# Patient Record
Sex: FEMALE | Race: WHITE | NOT HISPANIC OR LATINO | Employment: FULL TIME | URBAN - NONMETROPOLITAN AREA
[De-identification: names, ages, dates, MRNs, and addresses within clinical notes are randomized per-mention and may not be internally consistent; named-entity substitution may affect disease eponyms.]

---

## 2021-01-20 ENCOUNTER — TRANSCRIBE ORDERS (OUTPATIENT)
Dept: ADMINISTRATIVE | Facility: HOSPITAL | Age: 56
End: 2021-01-20

## 2021-01-20 DIAGNOSIS — Z01.818 PRE-OPERATIVE CLEARANCE: Primary | ICD-10-CM

## 2021-02-23 ENCOUNTER — TRANSCRIBE ORDERS (OUTPATIENT)
Dept: ADMINISTRATIVE | Facility: HOSPITAL | Age: 56
End: 2021-02-23

## 2021-02-23 DIAGNOSIS — Z01.818 PRE-OPERATIVE CLEARANCE: Primary | ICD-10-CM

## 2021-03-18 ENCOUNTER — HOSPITAL ENCOUNTER (OUTPATIENT)
Dept: HOSPITAL 79 - LAB | Age: 56
End: 2021-03-18
Attending: FAMILY MEDICINE
Payer: COMMERCIAL

## 2021-03-18 DIAGNOSIS — E78.2: Primary | ICD-10-CM

## 2021-03-18 DIAGNOSIS — L71.9: ICD-10-CM

## 2021-03-18 DIAGNOSIS — F34.1: ICD-10-CM

## 2021-03-18 DIAGNOSIS — Q23.1: ICD-10-CM

## 2021-03-18 LAB
BUN/CREATININE RATIO: 25 (ref 0–10)
HGB BLD-MCNC: 14 GM/DL (ref 12.3–15.3)
RED BLOOD COUNT: 4.29 M/UL (ref 4–5.1)
WHITE BLOOD COUNT: 9.2 K/UL (ref 4.5–11)

## 2021-03-25 ENCOUNTER — HOSPITAL ENCOUNTER (EMERGENCY)
Dept: HOSPITAL 79 - ER1 | Age: 56
Discharge: HOME | End: 2021-03-25
Payer: COMMERCIAL

## 2021-03-25 DIAGNOSIS — Z90.49: ICD-10-CM

## 2021-03-25 DIAGNOSIS — Z88.0: ICD-10-CM

## 2021-03-25 DIAGNOSIS — E78.5: ICD-10-CM

## 2021-03-25 DIAGNOSIS — L03.113: Primary | ICD-10-CM

## 2021-03-25 DIAGNOSIS — F17.210: ICD-10-CM

## 2021-03-25 LAB
BUN/CREATININE RATIO: 24 (ref 0–10)
HGB BLD-MCNC: 13.2 GM/DL (ref 12.3–15.3)
RED BLOOD COUNT: 4.25 M/UL (ref 4–5.1)
WHITE BLOOD COUNT: 8.3 K/UL (ref 4.5–11)

## 2021-06-25 ENCOUNTER — HOSPITAL ENCOUNTER (OUTPATIENT)
Dept: HOSPITAL 79 - RAD | Age: 56
End: 2021-06-25
Attending: NURSE PRACTITIONER
Payer: COMMERCIAL

## 2021-06-25 DIAGNOSIS — R31.9: Primary | ICD-10-CM

## 2021-06-25 DIAGNOSIS — N20.0: ICD-10-CM

## 2021-07-15 ENCOUNTER — HOSPITAL ENCOUNTER (OUTPATIENT)
Dept: HOSPITAL 79 - KOH-I | Age: 56
End: 2021-07-15
Attending: NURSE PRACTITIONER
Payer: COMMERCIAL

## 2021-07-15 DIAGNOSIS — N20.2: Primary | ICD-10-CM

## 2021-07-15 DIAGNOSIS — R19.09: ICD-10-CM

## 2021-07-30 ENCOUNTER — HOSPITAL ENCOUNTER (OUTPATIENT)
Dept: HOSPITAL 79 - RAD | Age: 56
End: 2021-07-30
Attending: NURSE PRACTITIONER
Payer: COMMERCIAL

## 2021-07-30 DIAGNOSIS — N20.0: Primary | ICD-10-CM

## 2021-08-26 ENCOUNTER — HOSPITAL ENCOUNTER (OUTPATIENT)
Dept: HOSPITAL 79 - OPSV2 | Age: 56
End: 2021-08-26
Payer: COMMERCIAL

## 2021-08-26 DIAGNOSIS — Z01.818: Primary | ICD-10-CM

## 2021-08-26 DIAGNOSIS — R07.9: ICD-10-CM

## 2021-08-26 DIAGNOSIS — N20.0: ICD-10-CM

## 2021-08-26 LAB
BUN/CREATININE RATIO: 21 (ref 0–10)
HGB BLD-MCNC: 12.9 GM/DL (ref 12.3–15.3)
RED BLOOD COUNT: 4.02 M/UL (ref 4–5.1)
WHITE BLOOD COUNT: 6.1 K/UL (ref 4.5–11)

## 2021-11-11 ENCOUNTER — HOSPITAL ENCOUNTER (OUTPATIENT)
Dept: HOSPITAL 79 - OPSV2 | Age: 56
End: 2021-11-11
Attending: OBSTETRICS & GYNECOLOGY
Payer: COMMERCIAL

## 2021-11-11 DIAGNOSIS — R10.2: ICD-10-CM

## 2021-11-11 DIAGNOSIS — Z01.812: Primary | ICD-10-CM

## 2021-11-11 LAB
HGB BLD-MCNC: 12.8 GM/DL (ref 12.3–15.3)
RED BLOOD COUNT: 4.03 M/UL (ref 4–5.1)
WHITE BLOOD COUNT: 7.3 K/UL (ref 4.5–11)

## 2021-11-18 ENCOUNTER — HOSPITAL ENCOUNTER (OUTPATIENT)
Dept: HOSPITAL 79 - OR | Age: 56
LOS: 1 days | Discharge: HOME | End: 2021-11-19
Attending: OBSTETRICS & GYNECOLOGY
Payer: COMMERCIAL

## 2021-11-18 VITALS — HEIGHT: 60 IN | WEIGHT: 209 LBS | BODY MASS INDEX: 41.03 KG/M2

## 2021-11-18 DIAGNOSIS — F32.9: ICD-10-CM

## 2021-11-18 DIAGNOSIS — N83.202: ICD-10-CM

## 2021-11-18 DIAGNOSIS — K21.9: ICD-10-CM

## 2021-11-18 DIAGNOSIS — M19.90: ICD-10-CM

## 2021-11-18 DIAGNOSIS — G47.30: ICD-10-CM

## 2021-11-18 DIAGNOSIS — K42.9: ICD-10-CM

## 2021-11-18 DIAGNOSIS — Z88.1: ICD-10-CM

## 2021-11-18 DIAGNOSIS — Z91.040: ICD-10-CM

## 2021-11-18 DIAGNOSIS — N80.0: ICD-10-CM

## 2021-11-18 DIAGNOSIS — Z88.0: ICD-10-CM

## 2021-11-18 DIAGNOSIS — Q50.4: ICD-10-CM

## 2021-11-18 DIAGNOSIS — N83.201: ICD-10-CM

## 2021-11-18 DIAGNOSIS — N18.30: ICD-10-CM

## 2021-11-18 DIAGNOSIS — N73.6: ICD-10-CM

## 2021-11-18 DIAGNOSIS — I12.9: ICD-10-CM

## 2021-11-18 DIAGNOSIS — D25.9: Primary | ICD-10-CM

## 2021-11-18 DIAGNOSIS — N72: ICD-10-CM

## 2021-11-18 DIAGNOSIS — E66.01: ICD-10-CM

## 2021-11-18 DIAGNOSIS — Z79.899: ICD-10-CM

## 2021-11-18 DIAGNOSIS — F17.210: ICD-10-CM

## 2021-11-18 DIAGNOSIS — E78.2: ICD-10-CM

## 2021-11-18 PROCEDURE — P9045 ALBUMIN (HUMAN), 5%, 250 ML: HCPCS

## 2021-11-18 PROCEDURE — P9047 ALBUMIN (HUMAN), 25%, 50ML: HCPCS

## 2021-11-18 PROCEDURE — C1781 MESH (IMPLANTABLE): HCPCS

## 2021-11-19 LAB — HGB BLD-MCNC: 9.4 GM/DL (ref 12.3–15.3)

## 2021-12-07 ENCOUNTER — OFFICE VISIT (OUTPATIENT)
Dept: GYNECOLOGIC ONCOLOGY | Facility: CLINIC | Age: 56
End: 2021-12-07

## 2021-12-07 VITALS
HEART RATE: 99 BPM | WEIGHT: 202 LBS | DIASTOLIC BLOOD PRESSURE: 87 MMHG | HEIGHT: 60 IN | RESPIRATION RATE: 15 BRPM | TEMPERATURE: 98.6 F | BODY MASS INDEX: 39.66 KG/M2 | SYSTOLIC BLOOD PRESSURE: 168 MMHG

## 2021-12-07 DIAGNOSIS — D39.10 PRIMARY OVARIAN GRANULOSA CELL TUMOR, UNSPECIFIED LATERALITY: ICD-10-CM

## 2021-12-07 DIAGNOSIS — D49.59 OVARIAN TUMOR: Primary | ICD-10-CM

## 2021-12-07 PROBLEM — N20.0 KIDNEY STONE: Status: ACTIVE | Noted: 2021-12-07

## 2021-12-07 PROBLEM — Z87.891 PERSONAL HISTORY OF TOBACCO USE: Status: ACTIVE | Noted: 2021-12-07

## 2021-12-07 PROBLEM — F34.1 DYSTHYMIA: Status: ACTIVE | Noted: 2021-12-07

## 2021-12-07 PROBLEM — Q23.1 BICUSPID AORTIC VALVE: Status: ACTIVE | Noted: 2021-12-07

## 2021-12-07 PROBLEM — E55.9 VITAMIN D DEFICIENCY: Status: ACTIVE | Noted: 2021-12-07

## 2021-12-07 PROBLEM — E78.2 MIXED HYPERLIPIDEMIA: Status: ACTIVE | Noted: 2021-12-07

## 2021-12-07 PROBLEM — J30.9 ALLERGIC RHINITIS: Status: ACTIVE | Noted: 2021-12-07

## 2021-12-07 PROBLEM — Q23.81 BICUSPID AORTIC VALVE: Status: ACTIVE | Noted: 2021-12-07

## 2021-12-07 PROBLEM — L71.9 ROSACEA: Status: ACTIVE | Noted: 2021-12-07

## 2021-12-07 PROCEDURE — 99204 OFFICE O/P NEW MOD 45 MIN: CPT | Performed by: OBSTETRICS & GYNECOLOGY

## 2021-12-07 RX ORDER — METRONIDAZOLE 500 MG/1
TABLET ORAL
COMMUNITY
Start: 2021-12-03 | End: 2021-12-11

## 2021-12-07 RX ORDER — OXYBUTYNIN CHLORIDE 10 MG/1
TABLET, EXTENDED RELEASE ORAL
COMMUNITY
Start: 2021-10-06

## 2021-12-07 RX ORDER — ROSUVASTATIN CALCIUM 20 MG/1
TABLET, COATED ORAL
COMMUNITY
Start: 2021-10-07

## 2021-12-07 NOTE — PROGRESS NOTES
Meliza Mabry  [id number]  1965      Reason for visit: Newly diagnosed granulosa cell tumor of the ovary    Consultation:  Patient is being seen at the request of Maria Victoria Gama MD    History of present illness:  The patient is a 56 y.o. year old female who presents today for treatment and evaluation of the above issues.    Meliza is a new patient who was diagnosed with granulosa cell tumor of the ovary. Her understanding for why she had her hysterectomy in 11/2021 is because she had 2 large cysts, and she opted to have the total over the partial hysterectomy. She brings her blood work with her today from 07/2021, which is reviewed with her.     She does have urinary frequency and occasional bladder spasm. The spasm is worse when she is dehydrated. She denies any urinary incontinence.    She smokes 2 cigarettes per day.  She reports she is healing well from surgery and did not have any complications.    For new patients, PFSH intake form from today was reviewed and confirmed.    OBGYN History:  She is a P1.  She does not use HRT. She does have a history of ASCUS Pap smear.  Benign cervical histology at the time of hysterectomy.    Oncologic History:  Oncology/Hematology History   Primary ovarian granulosa cell tumor, unspecified laterality   11/19/2021 Initial Diagnosis    Primary ovarian granulosa cell tumor, unspecified laterality     11/19/2021 Surgery    Hysterectomy, bilateral [facility]pingo[facility]oophorectomy at [facility]           Past Medical History:   Diagnosis Date   • Granulosa cell tumor        Past Surgical History:   Procedure Laterality Date   • CATARACT EXTRACTION  2021   • HYSTERECTOMY  11/2021       MEDICATIONS: The current medication list was reviewed with the patient and updated in the EMR this date per the Medical Assistant. Medication dosages and frequencies were confirmed to be accurate.      Allergies:  is allergic to sulfamethoxazole, amoxicillin, and latex.    Social History:  "  Social History     Socioeconomic History   • Marital status:    Tobacco Use   • Smoking status: Current Every Day Smoker     Packs/day: 0.10   • Smokeless tobacco: Never Used   Substance and Sexual Activity   • Alcohol use: Not Currently       Family History:  No family history on file.   History of cardiac disease in father and brother both of whom had myocardial infarction.  Mother had hypertension and high cholesterol.  No family history of cancer.    Health Maintenance:    Health Maintenance   Topic Date Due   • MAMMOGRAM  Never done   • COLORECTAL CANCER SCREENING  Never done   • ANNUAL PHYSICAL  Never done   • Pneumococcal Vaccine 0-64 (1 of 2 - PPSV23) Never done   • COVID-19 Vaccine (1) Never done   • TDAP/TD VACCINES (1 - Tdap) Never done   • ZOSTER VACCINE (1 of 2) Never done   • INFLUENZA VACCINE  Never done   • HEPATITIS C SCREENING  Never done   • LIPID PANEL  Never done     Review of Systems   Gastrointestinal: Positive for abdominal pain (Postop discomfort).   Genitourinary: Positive for frequency (Overactive bladder).     Physical Exam    Vitals:    12/07/21 1529   BP: 168/87   Pulse: 99   Resp: 15   Temp: 98.6 °F (37 °C)   TempSrc: Temporal   Weight: 91.6 kg (202 lb)   Height: 152.4 cm (60\")   PainSc:   5   PainLoc: Incisional       Body mass index is 39.45 kg/m².    Wt Readings from Last 3 Encounters:   12/07/21 91.6 kg (202 lb)     GENERAL: Alert, well-appearing female appearing her stated age who is in no apparent distress.  Patient is obese by BMI criteria.  HEENT: Sclera anicteric. Head normocephalic, atraumatic. Mucus membranes moist.   NECK: Trachea midline, supple, without masses.  No thyromegaly.   BREASTS: Deferred  CARDIOVASCULAR: Normal rate, regular rhythm, no murmurs, rubs, or gallops.  No peripheral edema.  RESPIRATORY: Clear to auscultation bilaterally, normal respiratory effort  BACK:  No CVA tenderness, no vertebral tenderness on palpation  GASTROINTESTINAL:  Abdomen is " soft, non-tender, non-distended, no rebound or guarding, no masses.   No HSM.  Incisions clean dry and intact throughout.  Umbilical hernia.  SKIN:  Warm, dry, well-perfused.  All visible areas intact.  No rashes, lesions, ulcers.  PSYCHIATRIC: AO x3, with appropriate affect, normal thought processes.  NEUROLOGIC: No focal deficits.  Moves extremities well.  MUSCULOSKELETAL: Normal gait and station.   EXTREMITIES:   No cyanosis, clubbing, symmetric.  LYMPHATICS:  No cervical or inguinal adenopathy noted.     PELVIC exam:    Deferred due to patient discomfort, postop status    ECOG PS 1 (postop)    PROCEDURES: None    Diagnostic Data:      No Images in the past 120 days found..    No results found for: WBC, HGB, HCT, MCV, PLT, NEUTROABS, GLUCOSE, BUN, CREATININE, EGFRIFNONA, EGFRIFAFRI, NA, K, CL, CO2, MG, PHOS, CALCIUM, ALBUMIN, AST, ALT, BILITOT  No results found for:         Assessment/Plan   This is a 56 y.o. woman with newly diagnosed granulosa cell tumor of the ovary.  Encounter Diagnoses   Name Primary?   • Ovarian tumor Yes   • Primary ovarian granulosa cell tumor, unspecified laterality      I reviewed the pathology report in detail.  Uterus was removed in a piecemeal fashion and it appears that the ovaries were removed intact.  I requested the operative report for my review.  CT scan of abdomen pelvis was ordered.  Preoperative tumor markers such as CA-125, AFP, were normal.  Inhibin a and B was not ordered.  We will go ahead and order this is a baseline.  Patient will follow up for discussion of CT scan results.  We will do a pelvic exam at that point as well.  We discussed that lymph node dissection is usually not a standard part of staging for granulosa cell tumor.  I would lean toward observation if there is no indication of ruptured on available data it of CT scan appears normal.  Further, granulosa cell tumors of the ovaries tend to be indolent and chemo resistant.  If she did develop recurrence,  she would undergo surgical management and systemic therapy.  Patient was overall reassured regarding her condition.  Await the above and follow-up.    Pain assessment was performed today as a part of patient’s care.  For patients with pain related to surgery, gynecologic malignancy or cancer treatment, the plan is as noted in the assessment/plan.  For patients with pain not related to these issues, they are to seek any further needed care from a more appropriate provider, such as PCP.      Orders Placed This Encounter   Procedures   • CT Abdomen Pelvis With Contrast     Standing Status:   Future     Standing Expiration Date:   12/7/2022     Scheduling Instructions:      Pt would like scans in [facility]     Order Specific Question:   Will Oral Contrast be needed for this procedure?     Answer:   Yes     Order Specific Question:   Release to patient     Answer:   Immediate   • Inhibin A, Ultrasensitive     Standing Status:   Future     Standing Expiration Date:   12/7/2022     Order Specific Question:   Release to patient     Answer:   Immediate   • Inhibin B     Standing Status:   Future     Standing Expiration Date:   12/7/2022     Order Specific Question:   Release to patient     Answer:   Immediate       FOLLOW UP: As above    Electronically Signed by: Harriet De La Garza MD  Date: 12/8/2021         Transcribed from ambient dictation for Harriet De La Garza MD by Sue Tejada.  12/07/21   17:53 EST    Patient verbalized consent to the visit recording.  I have personally performed the services described in this document as transcribed by the above individual, and it is both accurate and complete.  Harriet De La Garza MD  12/8/2021  10:10 EST

## 2021-12-09 ENCOUNTER — HOSPITAL ENCOUNTER (OUTPATIENT)
Dept: HOSPITAL 79 - LAB | Age: 56
End: 2021-12-09
Payer: COMMERCIAL

## 2021-12-09 DIAGNOSIS — D39.10: Primary | ICD-10-CM

## 2021-12-15 ENCOUNTER — TELEPHONE (OUTPATIENT)
Dept: GYNECOLOGIC ONCOLOGY | Facility: CLINIC | Age: 56
End: 2021-12-15

## 2021-12-15 NOTE — TELEPHONE ENCOUNTER
----- Message from Harriet De La Garza MD sent at 12/15/2021 12:35 PM EST -----  Please let patient know that tumor markers were normal.  Thank you  ----- Message -----  From: Sara Beltre Incoming  Sent: 12/15/2021   9:10 AM EST  To: Harriet De La Garza MD

## 2021-12-21 ENCOUNTER — HOSPITAL ENCOUNTER (OUTPATIENT)
Dept: HOSPITAL 79 - CT | Age: 56
End: 2021-12-21
Payer: COMMERCIAL

## 2021-12-21 DIAGNOSIS — N13.2: ICD-10-CM

## 2021-12-21 DIAGNOSIS — D49.59: Primary | ICD-10-CM

## 2021-12-29 ENCOUNTER — OFFICE VISIT (OUTPATIENT)
Dept: GYNECOLOGIC ONCOLOGY | Facility: CLINIC | Age: 56
End: 2021-12-29

## 2021-12-29 ENCOUNTER — LAB (OUTPATIENT)
Dept: LAB | Facility: HOSPITAL | Age: 56
End: 2021-12-29

## 2021-12-29 VITALS
HEART RATE: 81 BPM | DIASTOLIC BLOOD PRESSURE: 81 MMHG | RESPIRATION RATE: 16 BRPM | WEIGHT: 199 LBS | HEIGHT: 60 IN | SYSTOLIC BLOOD PRESSURE: 137 MMHG | BODY MASS INDEX: 39.07 KG/M2 | TEMPERATURE: 98 F | OXYGEN SATURATION: 96 %

## 2021-12-29 DIAGNOSIS — D39.10 PRIMARY OVARIAN GRANULOSA CELL TUMOR, UNSPECIFIED LATERALITY: Primary | ICD-10-CM

## 2021-12-29 DIAGNOSIS — Z98.890 POST-OPERATIVE STATE: ICD-10-CM

## 2021-12-29 LAB
ERYTHROCYTE [DISTWIDTH] IN BLOOD BY AUTOMATED COUNT: 13.8 % (ref 12.3–15.4)
HCT VFR BLD AUTO: 33.3 % (ref 34–46.6)
HGB BLD-MCNC: 11.1 G/DL (ref 12–15.9)
LYMPHOCYTES # BLD AUTO: 2.3 10*3/MM3 (ref 0.7–3.1)
LYMPHOCYTES NFR BLD AUTO: 29.3 % (ref 19.6–45.3)
MCH RBC QN AUTO: 31.8 PG (ref 26.6–33)
MCHC RBC AUTO-ENTMCNC: 33.3 G/DL (ref 31.5–35.7)
MCV RBC AUTO: 95.5 FL (ref 79–97)
MONOCYTES # BLD AUTO: 0.4 10*3/MM3 (ref 0.1–0.9)
MONOCYTES NFR BLD AUTO: 4.5 % (ref 5–12)
NEUTROPHILS NFR BLD AUTO: 5.2 10*3/MM3 (ref 1.7–7)
NEUTROPHILS NFR BLD AUTO: 66.2 % (ref 42.7–76)
PLATELET # BLD AUTO: 278 10*3/MM3 (ref 140–450)
PMV BLD AUTO: 7.6 FL (ref 6–12)
RBC # BLD AUTO: 3.48 10*6/MM3 (ref 3.77–5.28)
WBC NRBC COR # BLD: 7.8 10*3/MM3 (ref 3.4–10.8)

## 2021-12-29 PROCEDURE — 36415 COLL VENOUS BLD VENIPUNCTURE: CPT

## 2021-12-29 PROCEDURE — 99213 OFFICE O/P EST LOW 20 MIN: CPT | Performed by: OBSTETRICS & GYNECOLOGY

## 2021-12-29 PROCEDURE — 85025 COMPLETE CBC W/AUTO DIFF WBC: CPT

## 2021-12-29 RX ORDER — FLUCONAZOLE 100 MG/1
100 TABLET ORAL DAILY
COMMUNITY
End: 2023-01-23

## 2022-03-24 NOTE — PROGRESS NOTES
"     Follow Up Office Visit      Patient Name: Meliza Mabry  : 1965   MRN: [id number]     Chief Complaint:  Surveillance for history of granulosa cell tumor    History of Present Illness: Meliza Mabry is a 56 y.o. 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 Today, she is doing well. She denies vaginal bleeding and discharge. She does not have abdominal or pelvic pain. She does report an umbilical hernia which she has had for some time. It is reducible. It only bothers her when \"she eats too much\".  She is tolerating a regular diet and endorses a normal appetite.  She denies early satiety and bloating. Denies any CP, SOB, lightheadedness or dizziness. She denies changes in her bowel/bladder. Does have chronic constipation which she manages at home with a bowel regimen.    Oncologic History:  Oncology/Hematology History   Primary ovarian granulosa cell tumor, unspecified laterality   2021 Initial Diagnosis    Primary ovarian granulosa cell tumor, unspecified laterality     2021 Surgery    Hysterectomy, bilateral sal[facility]oophorectomy at [facility]          I have reviewed and the following portions of the patient's history were updated as appropriate: past family history, past medical history, past social history, past surgical history, past obstetrics/gynecologic history and problem list.    Medications: The current medication list was reviewed with the patient and updated in the EMR this date per the Medical Assistant. Medication dosages and frequencies were confirmed to be accurate.      Allergies:   Allergies   Allergen Reactions   • Sulfamethoxazole Other (See Comments)   • Amoxicillin Rash   • Latex Rash       Subjective    Review of Systems   Constitutional: Negative for appetite change, chills, fatigue and fever.   Respiratory: Negative for cough and shortness of breath.    Cardiovascular: " "Negative for chest pain, leg swelling and palpitations.   Gastrointestinal: Positive for constipation and nausea (Only when very constipated.). Negative for abdominal distention, abdominal pain, diarrhea and vomiting.   Genitourinary: Negative for difficulty urinating, frequency, vaginal bleeding and vaginal discharge.    Neurological: Negative for dizziness and light-headedness.       Objective     Physical Exam:  Vital Signs:   Vitals:    03/28/22 1058   BP: 138/83   Pulse: 96   Resp: 18   Temp: 98 °F (36.7 °C)   TempSrc: Tympanic   SpO2: 100%   Weight: 89.7 kg (197 lb 12.8 oz)   Height: 152.4 cm (60\")   PainSc: 0-No pain     BMI: Body mass index is 38.63 kg/m².               PHQ-2 Depression Screening  Little interest or pleasure in doing things?     Feeling down, depressed, or hopeless?     PHQ-2 Total Score           Physical Exam  Vitals and nursing note reviewed. Exam conducted with a chaperone present.   Constitutional:       General: She is not in acute distress.     Appearance: Normal appearance. She is well-developed. She is obese. She is not diaphoretic.   HENT:      Head: Normocephalic and atraumatic.      Right Ear: External ear normal.      Left Ear: External ear normal.      Nose:      Comments: Mask  Eyes:      General: No scleral icterus.        Right eye: No discharge.         Left eye: No discharge.      Conjunctiva/sclera: Conjunctivae normal.   Neck:      Thyroid: No thyromegaly.   Cardiovascular:      Rate and Rhythm: Normal rate and regular rhythm.      Heart sounds: No murmur heard.  Pulmonary:      Effort: Pulmonary effort is normal. No respiratory distress.      Breath sounds: Normal breath sounds. No wheezing.   Abdominal:      General: There is no distension.      Palpations: Abdomen is soft. There is no mass.      Tenderness: There is no abdominal tenderness. There is no guarding.      Hernia: A hernia (Apprxoimately 3 cm umbilical hernia that is reducible and nontender) is present. "   Genitourinary:     Comments: External genitalia normal. Vagina without discharge. Cervix, uterus and adnexa surgically absent. Rectovaginal exam deferred.  Musculoskeletal:         General: No swelling, tenderness, deformity or signs of injury.      Cervical back: Neck supple.      Right lower leg: No edema.      Left lower leg: No edema.   Lymphadenopathy:      Cervical: No cervical adenopathy.   Skin:     General: Skin is warm and dry.      Coloration: Skin is not jaundiced.      Findings: No erythema, lesion or rash.   Neurological:      General: No focal deficit present.      Mental Status: She is alert and oriented to person, place, and time. Mental status is at baseline.      Motor: No abnormal muscle tone.   Psychiatric:         Behavior: Behavior normal.         Thought Content: Thought content normal.       Date Inhibin A Inhibin B    12/7/2021 0.5 8.6    3/28/2022                                  Assessment / Plan    Meliza Mabry is a 56 y.o. female who has a history of a granulosa cell tumor s/p incomplete staging at OSH followed by negative imaging (treatment completed in 11/2021).  She is currently without clinical evidence of disease. Signs of recurrent disease, such as abdominal bloating or distention, vaginal bleeding, abdominopelvic pain, urinary or bowel changes, and shortness of breath were reviewed. She was advised to follow up immediately if she develops any of the above symptoms. She was also reminded to maintain a healthy lifestyle with a well balanced diet, calcium and vitamin D for osteoporosis prevention, and exercise as well as continue with recommended health and cancer screening guidelines. She will follow-up in 3 month with inhibin A and B prior to her next visit.    Umbilical hernia: Largely asymptomatic. Discussed ER precautions. Patient does not desire surgical intervention at this time.    Pain assessment was performed today as a part of patient’s care.  For patients with pain  related to surgery, gynecologic malignancy or cancer treatment, the plan is as noted in the assessment/plan.  For patients with pain not related to these issues, they are to seek any further needed care from a more appropriate provider, such as PCP.      Diagnoses and all orders for this visit:    1. History of ovarian cancer (Primary)  -     Inhibin A, Ultrasensitive  -     Inhibin B    2. Primary ovarian granulosa cell tumor, unspecified laterality  -     Inhibin A, Ultrasensitive  -     Inhibin B    3. Umbilical hernia without obstruction and without gangrene         Follow Up: 3 months    HEYDI Rhoades MD  Gynecologic Oncology

## 2022-03-28 ENCOUNTER — OFFICE VISIT (OUTPATIENT)
Dept: GYNECOLOGIC ONCOLOGY | Facility: CLINIC | Age: 57
End: 2022-03-28

## 2022-03-28 VITALS
HEIGHT: 60 IN | HEART RATE: 96 BPM | OXYGEN SATURATION: 100 % | WEIGHT: 197.8 LBS | SYSTOLIC BLOOD PRESSURE: 138 MMHG | TEMPERATURE: 98 F | BODY MASS INDEX: 38.83 KG/M2 | DIASTOLIC BLOOD PRESSURE: 83 MMHG | RESPIRATION RATE: 18 BRPM

## 2022-03-28 DIAGNOSIS — K42.9 UMBILICAL HERNIA WITHOUT OBSTRUCTION AND WITHOUT GANGRENE: ICD-10-CM

## 2022-03-28 DIAGNOSIS — Z85.43 HISTORY OF OVARIAN CANCER: Primary | ICD-10-CM

## 2022-03-28 DIAGNOSIS — D39.10 PRIMARY OVARIAN GRANULOSA CELL TUMOR, UNSPECIFIED LATERALITY: ICD-10-CM

## 2022-03-28 PROCEDURE — 99213 OFFICE O/P EST LOW 20 MIN: CPT | Performed by: OBSTETRICS & GYNECOLOGY

## 2022-03-28 PROCEDURE — 83520 IMMUNOASSAY QUANT NOS NONAB: CPT | Performed by: OBSTETRICS & GYNECOLOGY

## 2022-03-28 PROCEDURE — 86336 INHIBIN A: CPT | Performed by: OBSTETRICS & GYNECOLOGY

## 2022-03-31 LAB
INHIBIN A SERPL-MCNC: 0.7 PG/ML
INHIBIN B SERPL-MCNC: <7 PG/ML (ref 0–16.9)

## 2022-04-01 ENCOUNTER — TELEPHONE (OUTPATIENT)
Dept: GYNECOLOGIC ONCOLOGY | Facility: CLINIC | Age: 57
End: 2022-04-01

## 2022-04-01 NOTE — TELEPHONE ENCOUNTER
----- Message from Harriet De La Garza MD sent at 4/1/2022  9:58 AM EDT -----  Please notify patient tumor markers normal range.  Thanks      ----- Message -----  From: Lab, Background User  Sent: 3/31/2022   3:12 PM EDT  To: Harriet De La Garza MD

## 2022-05-16 ENCOUNTER — HOSPITAL ENCOUNTER (OUTPATIENT)
Dept: HOSPITAL 79 - KOH-I | Age: 57
End: 2022-05-16
Attending: FAMILY MEDICINE
Payer: COMMERCIAL

## 2022-05-16 DIAGNOSIS — F17.210: Primary | ICD-10-CM

## 2022-06-27 ENCOUNTER — OFFICE VISIT (OUTPATIENT)
Dept: GYNECOLOGIC ONCOLOGY | Facility: CLINIC | Age: 57
End: 2022-06-27

## 2022-06-27 VITALS
HEART RATE: 94 BPM | BODY MASS INDEX: 38.28 KG/M2 | SYSTOLIC BLOOD PRESSURE: 155 MMHG | OXYGEN SATURATION: 98 % | DIASTOLIC BLOOD PRESSURE: 93 MMHG | WEIGHT: 196 LBS | RESPIRATION RATE: 16 BRPM

## 2022-06-27 DIAGNOSIS — Z85.43 HISTORY OF OVARIAN CANCER: Primary | ICD-10-CM

## 2022-06-27 DIAGNOSIS — D39.10 PRIMARY OVARIAN GRANULOSA CELL TUMOR, UNSPECIFIED LATERALITY: ICD-10-CM

## 2022-06-27 DIAGNOSIS — K42.9 UMBILICAL HERNIA WITHOUT OBSTRUCTION AND WITHOUT GANGRENE: ICD-10-CM

## 2022-06-27 PROCEDURE — 99213 OFFICE O/P EST LOW 20 MIN: CPT | Performed by: OBSTETRICS & GYNECOLOGY

## 2022-06-27 NOTE — PROGRESS NOTES
Follow Up Office Visit      Patient Name: Meliza Mabry  : 1965   MRN: [id number]     Chief Complaint:  Surveillance for history of granulosa cell tumor    History of Present Illness: Meliza Mabry is a 57 y.o. 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 Today, she is doing well. She denies vaginal bleeding and discharge. She does not have abdominal or pelvic pain. She feels like her umbilical hernia is stable. She is tolerating a regular diet and endorses a normal appetite.  She denies early satiety and bloating. Denies any CP, SOB, lightheadedness or dizziness. She denies changes in her bowel/bladder. Does have chronic constipation which she manages at home with a bowel regimen but this is stable. Has a trip to [place] planned.    Oncologic History:  Oncology/Hematology History   Primary ovarian granulosa cell tumor, unspecified laterality   2021 Initial Diagnosis    Primary ovarian granulosa cell tumor, unspecified laterality     2021 Surgery    Hysterectomy, bilateral salpingo-oophorectomy at [facility]          I have reviewed and the following portions of the patient's history were updated as appropriate: past family history, past medical history, past social history, past surgical history, past obstetrics/gynecologic history and problem list.    Medications: The current medication list was reviewed with the patient and updated in the EMR this date per the Medical Assistant. Medication dosages and frequencies were confirmed to be accurate.      Allergies:   Allergies   Allergen Reactions   • Sulfamethoxazole Other (See Comments)   • Amoxicillin Rash   • Latex Rash       Subjective    Review of Systems   Constitutional: Negative for appetite change, chills, fatigue and fever.   Respiratory: Negative for cough and shortness of breath.    Cardiovascular: Negative for chest pain, leg swelling  and palpitations.   Gastrointestinal: Positive for constipation. Negative for abdominal distention, abdominal pain, diarrhea, nausea and vomiting.   Genitourinary: Negative for difficulty urinating, frequency, vaginal bleeding and vaginal discharge.    Neurological: Negative for dizziness and light-headedness.       Objective     Physical Exam:  Vital Signs:   Vitals:    06/27/22 1104   BP: 155/93   Pulse: 94   Resp: 16   SpO2: 98%   Weight: 88.9 kg (196 lb)   PainSc: 0-No pain     BMI: Body mass index is 38.28 kg/m².               PHQ-2 Depression Screening  Little interest or pleasure in doing things? 0-->not at all   Feeling down, depressed, or hopeless? 0-->not at all   PHQ-2 Total Score 0         Physical Exam  Vitals and nursing note reviewed. Exam conducted with a chaperone present.   Constitutional:       General: She is not in acute distress.     Appearance: Normal appearance. She is well-developed. She is obese. She is not diaphoretic.   HENT:      Head: Normocephalic and atraumatic.      Right Ear: External ear normal.      Left Ear: External ear normal.      Nose:      Comments: Mask  Eyes:      General: No scleral icterus.        Right eye: No discharge.         Left eye: No discharge.      Conjunctiva/sclera: Conjunctivae normal.   Neck:      Thyroid: No thyromegaly.   Cardiovascular:      Rate and Rhythm: Normal rate and regular rhythm.      Heart sounds: No murmur heard.  Pulmonary:      Effort: Pulmonary effort is normal. No respiratory distress.      Breath sounds: Normal breath sounds. No wheezing.   Abdominal:      General: There is no distension.      Palpations: Abdomen is soft. There is no mass.      Tenderness: There is no abdominal tenderness. There is no guarding.      Hernia: A hernia (Approximately 4 cm umbilical hernia that is reducible and nontender) is present.   Genitourinary:     Comments: External genitalia normal. Vagina without discharge. Cervix, uterus and adnexa surgically  absent. Rectovaginal exam deferred.  Musculoskeletal:         General: No swelling, tenderness, deformity or signs of injury.      Cervical back: Neck supple.      Right lower leg: No edema.      Left lower leg: No edema.   Lymphadenopathy:      Cervical: No cervical adenopathy.   Skin:     General: Skin is warm and dry.      Coloration: Skin is not jaundiced.      Findings: No erythema, lesion or rash.   Neurological:      General: No focal deficit present.      Mental Status: She is alert and oriented to person, place, and time. Mental status is at baseline.      Motor: No abnormal muscle tone.   Psychiatric:         Behavior: Behavior normal.         Thought Content: Thought content normal.       Date Inhibin A Inhibin B    12/7/2021 0.5 8.6    3/28/2022 0.7 <7                                Assessment / Plan    Meliza Mabry is a 57 y.o. female who has a history of a granulosa cell tumor s/p incomplete staging at OSH followed by negative imaging (treatment completed in 11/2021).  She is currently without clinical evidence of disease. Signs of recurrent disease, such as abdominal bloating or distention, vaginal bleeding, abdominopelvic pain, urinary or bowel changes, and shortness of breath were reviewed. She was advised to follow up immediately if she develops any of the above symptoms. She was also reminded to maintain a healthy lifestyle with a well balanced diet, calcium and vitamin D for osteoporosis prevention, and exercise as well as continue with recommended health and cancer screening guidelines. She will follow-up in 3 month with inhibin A and B prior to her next visit.    Umbilical hernia: Remains asymptomatic. Discussed ER precautions. Patient does not desire surgical intervention at this time.    Pain assessment was performed today as a part of patient’s care.  For patients with pain related to surgery, gynecologic malignancy or cancer treatment, the plan is as noted in the assessment/plan.  For  patients with pain not related to these issues, they are to seek any further needed care from a more appropriate provider, such as PCP.      Diagnoses and all orders for this visit:    1. History of ovarian cancer (Primary)    2. Primary ovarian granulosa cell tumor, unspecified laterality  -     Inhibin A, Ultrasensitive; Future  -     Inhibin B; Future    3. Umbilical hernia without obstruction and without gangrene         Follow Up: 3 months    HEYDI Rhoades MD  Gynecologic Oncology

## 2022-07-24 ENCOUNTER — HOSPITAL ENCOUNTER (OUTPATIENT)
Dept: HOSPITAL 79 - LAB | Age: 57
End: 2022-07-24
Payer: COMMERCIAL

## 2022-07-24 DIAGNOSIS — D39.10: Primary | ICD-10-CM

## 2022-07-26 ENCOUNTER — TELEPHONE (OUTPATIENT)
Dept: GYNECOLOGIC ONCOLOGY | Facility: CLINIC | Age: 57
End: 2022-07-26

## 2022-07-26 NOTE — TELEPHONE ENCOUNTER
Called pt and she had inhibins albert and she was just waiting on labs. She did not need anything faxed to her knowledge.

## 2022-07-27 ENCOUNTER — TELEPHONE (OUTPATIENT)
Dept: GYNECOLOGIC ONCOLOGY | Facility: CLINIC | Age: 57
End: 2022-07-27

## 2022-07-27 NOTE — TELEPHONE ENCOUNTER
Caller: CHRISTIN MARQUES.    Relationship: Provider    Best call back number: [phone]    What orders are you requesting (i.e. lab or imaging): THEY HAVE ORDERS BUT THEY WERE NOT SIGNED BY DR. GUTIERREZ & THEY NEED SIGNED COPIES.    In what timeframe would the patient need to come in: ASAP, AS THEY HAVE ALREADY DRAWN THE LABS.    Where will you receive your lab/imaging services: [facility]    Additional notes: CHRISTIN SPOKE WITH SOMEONE IN THE OFFICE ON TUES. & THAT PERSON STATED SHE WOULD HAVE THEM FAXED RIGHT AWAY & THEY NEVER GOT THEM.  PLEASE FAX -[phone] OR ELECTRONICALLY [phone].             Yes

## 2022-07-29 NOTE — TELEPHONE ENCOUNTER
[facility] CALLING BACK WANTED TO GIVE ANOTHER FAX NUMBER THEY DONT THINK THE OTHER IS WORKING    PLEASE RE FAX TO #[phone]

## 2022-07-29 NOTE — TELEPHONE ENCOUNTER
RAY, CAN YOU PLEASE FAX AGAIN TO  [phone], AS CHRISTIN DID NOT RECEIVE THE ORIGINAL  ONE YOU FAXED, CAN YOU ALSO CALL HER TO WHEN YOU FAX IT , SO SHE CAN BE LOOKING FOR IT, [phone]?  THANK YOU

## 2022-10-24 ENCOUNTER — TELEPHONE (OUTPATIENT)
Dept: GYNECOLOGIC ONCOLOGY | Facility: CLINIC | Age: 57
End: 2022-10-24

## 2022-10-24 NOTE — TELEPHONE ENCOUNTER
Caller: Meliza Mabry    Relationship: Self    Best call back number: [phone]        Who are you requesting to speak with (clinical staff, provider,  specific staff member): SCHEDULING          What was the call regarding: NEEDING TO R/S APPT TODAY 10/24 WITH DR CONWAY IN [place]    Do you require a callback: YES

## 2022-11-28 ENCOUNTER — TELEPHONE (OUTPATIENT)
Dept: GYNECOLOGIC ONCOLOGY | Facility: CLINIC | Age: 57
End: 2022-11-28

## 2022-11-28 NOTE — TELEPHONE ENCOUNTER
Caller: Meliza Mabry    Relationship: Self    Best call back number: [phone]    What is the best time to reach you: ANYTIME    Who are you requesting to speak with (clinical staff, provider,  specific staff member): SCHEDULING    What was the call regarding: PT CALLING TO R/S HER APPT FOR TODAY AT THE [facility] - SHE HAS COVID.     PLEASE CALL TO R/S.     Do you require a callback: YES

## 2023-01-23 ENCOUNTER — OFFICE VISIT (OUTPATIENT)
Dept: GYNECOLOGIC ONCOLOGY | Facility: CLINIC | Age: 58
End: 2023-01-23
Payer: COMMERCIAL

## 2023-01-23 VITALS
TEMPERATURE: 98.2 F | HEIGHT: 60 IN | BODY MASS INDEX: 38.36 KG/M2 | RESPIRATION RATE: 18 BRPM | OXYGEN SATURATION: 100 % | DIASTOLIC BLOOD PRESSURE: 80 MMHG | HEART RATE: 106 BPM | SYSTOLIC BLOOD PRESSURE: 123 MMHG | WEIGHT: 195.4 LBS

## 2023-01-23 DIAGNOSIS — K42.9 UMBILICAL HERNIA WITHOUT OBSTRUCTION AND WITHOUT GANGRENE: ICD-10-CM

## 2023-01-23 DIAGNOSIS — D39.10 PRIMARY OVARIAN GRANULOSA CELL TUMOR, UNSPECIFIED LATERALITY: Primary | ICD-10-CM

## 2023-01-23 PROCEDURE — 99213 OFFICE O/P EST LOW 20 MIN: CPT | Performed by: OBSTETRICS & GYNECOLOGY

## 2023-01-23 RX ORDER — FLUCONAZOLE 150 MG/1
150 TABLET ORAL ONCE
Qty: 1 TABLET | Refills: 1 | Status: SHIPPED | OUTPATIENT
Start: 2023-01-23 | End: 2023-01-23

## 2023-01-23 RX ORDER — CETIRIZINE HYDROCHLORIDE 10 MG/1
10 TABLET ORAL DAILY
COMMUNITY
Start: 2022-12-07 | End: 2023-03-08

## 2023-01-23 RX ORDER — FENOFIBRATE 145 MG/1
TABLET, COATED ORAL
COMMUNITY
Start: 2023-01-06

## 2023-01-23 NOTE — PROGRESS NOTES
Follow Up Office Visit      Patient Name: Meliza Mabry  : 1965   MRN: [id number]     Chief Complaint:  Surveillance for history of granulosa cell tumor    History of Present Illness: Meliza Mabry is a 57 y.o. 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 Last surveillance visit rescheduled as patient had COVID. Today, she is doing well. She denies vaginal bleeding and discharge. She does not have abdominal or pelvic pain. She feels like her umbilical hernia is stable. She is considering an evaluation for possible repair. She is tolerating a regular diet and endorses a normal appetite.  She denies early satiety and bloating. Denies any CP, SOB, lightheadedness or dizziness. She denies changes in her bowel/bladder.    Oncologic History:  Oncology/Hematology History   Primary ovarian granulosa cell tumor, unspecified laterality   2021 Initial Diagnosis    Primary ovarian granulosa cell tumor, unspecified laterality     2021 Surgery    Hysterectomy, bilateral salpingo-oophorectomy at [facility]          I have reviewed and the following portions of the patient's history were updated as appropriate: past family history, past medical history, past social history, past surgical history, past obstetrics/gynecologic history and problem list.    Medications: The current medication list was reviewed with the patient and updated in the EMR this date per the Medical Assistant. Medication dosages and frequencies were confirmed to be accurate.      Allergies:   Allergies   Allergen Reactions   • Sulfamethoxazole Other (See Comments)   • Amoxicillin Rash   • Latex Rash       Subjective    Review of Systems   Constitutional: Negative for appetite change, chills, fatigue and fever.   Respiratory: Negative for cough and shortness of breath.    Cardiovascular: Negative for chest pain, leg swelling and palpitations.  "  Gastrointestinal: Positive for constipation. Negative for abdominal distention, abdominal pain, diarrhea, nausea and vomiting.   Genitourinary: Negative for difficulty urinating, frequency, vaginal bleeding and vaginal discharge.    Neurological: Negative for dizziness and light-headedness.       Objective     Physical Exam:  Vital Signs:   Vitals:    01/23/23 1348   BP: 123/80   Pulse: 106   Resp: 18   Temp: 98.2 °F (36.8 °C)   TempSrc: Infrared   SpO2: 100%   Weight: 88.6 kg (195 lb 6.4 oz)   Height: 152.4 cm (60\")   PainSc: 0-No pain     BMI: Body mass index is 38.16 kg/m².   ECOG score: 0           PHQ-2 Depression Screening  Little interest or pleasure in doing things? 0-->not at all   Feeling down, depressed, or hopeless? 0-->not at all   PHQ-2 Total Score 0         Physical Exam  Vitals and nursing note reviewed. Exam conducted with a chaperone present.   Constitutional:       General: She is not in acute distress.     Appearance: Normal appearance. She is well-developed. She is obese. She is not diaphoretic.   HENT:      Head: Normocephalic and atraumatic.      Right Ear: External ear normal.      Left Ear: External ear normal.      Nose:      Comments: Mask  Eyes:      General: No scleral icterus.        Right eye: No discharge.         Left eye: No discharge.      Conjunctiva/sclera: Conjunctivae normal.   Neck:      Thyroid: No thyromegaly.   Cardiovascular:      Rate and Rhythm: Normal rate and regular rhythm.      Heart sounds: No murmur heard.  Pulmonary:      Effort: Pulmonary effort is normal. No respiratory distress.      Breath sounds: Normal breath sounds. No wheezing.   Abdominal:      General: There is no distension.      Palpations: Abdomen is soft. There is no mass.      Tenderness: There is no abdominal tenderness. There is no guarding.      Hernia: A hernia (Approximately 4 cm umbilical hernia that is reducible and nontender) is present.   Genitourinary:     Comments: External genitalia " normal. Vaginal mucosa slightly erythematous with white discharge. Cervix, uterus and adnexa surgically absent. Rectovaginal exam deferred.  Musculoskeletal:         General: No swelling, tenderness, deformity or signs of injury.      Cervical back: Neck supple.      Right lower leg: No edema.      Left lower leg: No edema.   Lymphadenopathy:      Cervical: No cervical adenopathy.   Skin:     General: Skin is warm and dry.      Coloration: Skin is not jaundiced.      Findings: No erythema, lesion or rash.   Neurological:      General: No focal deficit present.      Mental Status: She is alert and oriented to person, place, and time. Mental status is at baseline.      Motor: No abnormal muscle tone.   Psychiatric:         Behavior: Behavior normal.         Thought Content: Thought content normal.       Date Inhibin A Inhibin B    12/7/2021 0.5 8.6    3/28/2022 0.7 <7    7/24/2022 2.9 <7                          Assessment / Plan    Meliza Mabry is a 57 y.o. female who has a history of a granulosa cell tumor s/p incomplete staging at OSH followed by negative imaging (treatment completed in 11/2021).  She is currently without clinical evidence of disease. Signs of recurrent disease, such as abdominal bloating or distention, vaginal bleeding, abdominopelvic pain, urinary or bowel changes, and shortness of breath were reviewed. She was advised to follow up immediately if she develops any of the above symptoms. She was also reminded to maintain a healthy lifestyle with a well balanced diet, calcium and vitamin D for osteoporosis prevention, and exercise as well as continue with recommended health and cancer screening guidelines. She will follow-up in 3 month with inhibin A and B prior to her next visit.    Umbilical hernia: Remains asymptomatic. Discussed ER precautions. Patient considering seeing a local general surgeon to discuss possible repair.  Vulvovaginal candidiasis: Rx for diflucan today.     Diagnoses and all  orders for this visit:    1. Primary ovarian granulosa cell tumor, unspecified laterality (Primary)  -     Inhibin A, Ultrasensitive; Future  -     Inhibin B; Future    2. Umbilical hernia without obstruction and without gangrene    Other orders  -     fluconazole (DIFLUCAN) 150 MG tablet; Take 1 tablet by mouth 1 (One) Time for 1 dose.  Dispense: 1 tablet; Refill: 1         Follow Up: 3 months    HEYDI Rhoades MD  Gynecologic Oncology

## 2023-01-23 NOTE — LETTER
2023     Samuel Duane Kreis, MD  [address] [place] [address] [place] [address]    Patient: Meliza Mabry   YOB: 1965   Date of Visit: 2023       Dear Samuel Duane Kreis, MD    Meliza Mabry was in my office today. Below is a copy of my note.    If you have questions, please do not hesitate to call me. I look forward to following Meliza along with you.         Sincerely,        Guerda Rhoades MD        CC: No Recipients         Follow Up Office Visit      Patient Name: Meliza Mabry  : 1965   MRN: [id number]     Chief Complaint:  Surveillance for history of granulosa cell tumor    History of Present Illness: Meliza Mabry is a 57 y.o. 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 Last surveillance visit rescheduled as patient had COVID. Today, she is doing well. She denies vaginal bleeding and discharge. She does not have abdominal or pelvic pain. She feels like her umbilical hernia is stable. She is considering an evaluation for possible repair. She is tolerating a regular diet and endorses a normal appetite.  She denies early satiety and bloating. Denies any CP, SOB, lightheadedness or dizziness. She denies changes in her bowel/bladder.    Oncologic History:  Oncology/Hematology History   Primary ovarian granulosa cell tumor, unspecified laterality   2021 Initial Diagnosis    Primary ovarian granulosa cell tumor, unspecified laterality     2021 Surgery    Hysterectomy, bilateral salpingo-oophorectomy at [facility]          I have reviewed and the following portions of the patient's history were updated as appropriate: past family history, past medical history, past social history, past surgical history, past obstetrics/gynecologic history and problem list.    Medications: The current medication list was reviewed with the patient and updated in the EMR this date per the  "Medical Assistant. Medication dosages and frequencies were confirmed to be accurate.      Allergies:   Allergies   Allergen Reactions   • Sulfamethoxazole Other (See Comments)   • Amoxicillin Rash   • Latex Rash       Subjective    Review of Systems   Constitutional: Negative for appetite change, chills, fatigue and fever.   Respiratory: Negative for cough and shortness of breath.    Cardiovascular: Negative for chest pain, leg swelling and palpitations.   Gastrointestinal: Positive for constipation. Negative for abdominal distention, abdominal pain, diarrhea, nausea and vomiting.   Genitourinary: Negative for difficulty urinating, frequency, vaginal bleeding and vaginal discharge.    Neurological: Negative for dizziness and light-headedness.       Objective     Physical Exam:  Vital Signs:   Vitals:    01/23/23 1348   BP: 123/80   Pulse: 106   Resp: 18   Temp: 98.2 °F (36.8 °C)   TempSrc: Infrared   SpO2: 100%   Weight: 88.6 kg (195 lb 6.4 oz)   Height: 152.4 cm (60\")   PainSc: 0-No pain     BMI: Body mass index is 38.16 kg/m².   ECOG score: 0           PHQ-2 Depression Screening  Little interest or pleasure in doing things? 0-->not at all   Feeling down, depressed, or hopeless? 0-->not at all   PHQ-2 Total Score 0         Physical Exam  Vitals and nursing note reviewed. Exam conducted with a chaperone present.   Constitutional:       General: She is not in acute distress.     Appearance: Normal appearance. She is well-developed. She is obese. She is not diaphoretic.   HENT:      Head: Normocephalic and atraumatic.      Right Ear: External ear normal.      Left Ear: External ear normal.      Nose:      Comments: Mask  Eyes:      General: No scleral icterus.        Right eye: No discharge.         Left eye: No discharge.      Conjunctiva/sclera: Conjunctivae normal.   Neck:      Thyroid: No thyromegaly.   Cardiovascular:      Rate and Rhythm: Normal rate and regular rhythm.      Heart sounds: No murmur " heard.  Pulmonary:      Effort: Pulmonary effort is normal. No respiratory distress.      Breath sounds: Normal breath sounds. No wheezing.   Abdominal:      General: There is no distension.      Palpations: Abdomen is soft. There is no mass.      Tenderness: There is no abdominal tenderness. There is no guarding.      Hernia: A hernia (Approximately 4 cm umbilical hernia that is reducible and nontender) is present.   Genitourinary:     Comments: External genitalia normal. Vaginal mucosa slightly erythematous with white discharge. Cervix, uterus and adnexa surgically absent. Rectovaginal exam deferred.  Musculoskeletal:         General: No swelling, tenderness, deformity or signs of injury.      Cervical back: Neck supple.      Right lower leg: No edema.      Left lower leg: No edema.   Lymphadenopathy:      Cervical: No cervical adenopathy.   Skin:     General: Skin is warm and dry.      Coloration: Skin is not jaundiced.      Findings: No erythema, lesion or rash.   Neurological:      General: No focal deficit present.      Mental Status: She is alert and oriented to person, place, and time. Mental status is at baseline.      Motor: No abnormal muscle tone.   Psychiatric:         Behavior: Behavior normal.         Thought Content: Thought content normal.       Date Inhibin A Inhibin B    12/7/2021 0.5 8.6    3/28/2022 0.7 <7    7/24/2022 2.9 <7                          Assessment / Plan    Meliza Mabry is a 57 y.o. female who has a history of a granulosa cell tumor s/p incomplete staging at OSH followed by negative imaging (treatment completed in 11/2021).  She is currently without clinical evidence of disease. Signs of recurrent disease, such as abdominal bloating or distention, vaginal bleeding, abdominopelvic pain, urinary or bowel changes, and shortness of breath were reviewed. She was advised to follow up immediately if she develops any of the above symptoms. She was also reminded to maintain a healthy  lifestyle with a well balanced diet, calcium and vitamin D for osteoporosis prevention, and exercise as well as continue with recommended health and cancer screening guidelines. She will follow-up in 3 month with inhibin A and B prior to her next visit.    Umbilical hernia: Remains asymptomatic. Discussed ER precautions. Patient considering seeing a local general surgeon to discuss possible repair.  Vulvovaginal candidiasis: Rx for diflucan today.     Diagnoses and all orders for this visit:    1. Primary ovarian granulosa cell tumor, unspecified laterality (Primary)  -     Inhibin A, Ultrasensitive; Future  -     Inhibin B; Future    2. Umbilical hernia without obstruction and without gangrene    Other orders  -     fluconazole (DIFLUCAN) 150 MG tablet; Take 1 tablet by mouth 1 (One) Time for 1 dose.  Dispense: 1 tablet; Refill: 1         Follow Up: 3 months    HEYDI Rhoades MD  Gynecologic Oncology

## 2023-01-31 ENCOUNTER — TELEPHONE (OUTPATIENT)
Dept: GYNECOLOGIC ONCOLOGY | Facility: CLINIC | Age: 58
End: 2023-01-31
Payer: COMMERCIAL

## 2023-04-24 ENCOUNTER — OFFICE VISIT (OUTPATIENT)
Dept: GYNECOLOGIC ONCOLOGY | Facility: CLINIC | Age: 58
End: 2023-04-24
Payer: COMMERCIAL

## 2023-04-24 VITALS
OXYGEN SATURATION: 100 % | HEIGHT: 60 IN | TEMPERATURE: 98 F | BODY MASS INDEX: 37.18 KG/M2 | RESPIRATION RATE: 18 BRPM | SYSTOLIC BLOOD PRESSURE: 127 MMHG | DIASTOLIC BLOOD PRESSURE: 80 MMHG | HEART RATE: 92 BPM | WEIGHT: 189.4 LBS

## 2023-04-24 DIAGNOSIS — L29.2 VULVAR ITCHING: ICD-10-CM

## 2023-04-24 DIAGNOSIS — D39.10 PRIMARY OVARIAN GRANULOSA CELL TUMOR, UNSPECIFIED LATERALITY: Primary | ICD-10-CM

## 2023-04-24 PROCEDURE — 99213 OFFICE O/P EST LOW 20 MIN: CPT | Performed by: OBSTETRICS & GYNECOLOGY

## 2023-04-24 RX ORDER — ACETAMINOPHEN 500 MG
500 TABLET ORAL EVERY 6 HOURS PRN
COMMUNITY

## 2023-04-24 NOTE — PATIENT INSTRUCTIONS
Vaginal Dryness    Vaginal dryness is a common problem that occurs during or after menopause - either natural or surgical. It is a sign of vaginal atrophy which is thinning,  which is thinning, and inflammation of the vaginal walls most often due to a decline in estrogen. Along with vaginal dryness you may experience itching or stinging around the opening and lower part of the vagina as well as painful or uncomfortable intercourse. Bubble baths, douches, and soaps can further irritate the sensitive tissue in the vagina and are best avoided.    There are two main treatments for vaginal dryness:  Vaginal estrogen  Vaginal moisturizers and lubricants    Vaginal estrogen is available by prescription and comes in several forms: creams, tablets, and vaginal ring that are all inserted directly into the vagina. Because estrogen has a role in many gynecologic cancers, vaginal estrogen may not be a safe option. Your physician will be able to tell you if vaginal estrogen is a possibility for your specific situation.     Vaginal moisturizers and lubricants are available over-the-counter without a prescription and do not contain hormones. A vaginal moisturizer can beis used to help keep the vagina moist on a day-to- day basis and a vaginal lubricant can beis used to prior to intercourse to reduce friction and discomfort. The lubricants should be water based if condoms are used as oil-based lubricants can be damaging to latex..   Do not use a lubricant with a petroleum base because it can cause vaginal infections.     Do not use mineral oil, baby oil, Vaseline or body lotions. Avoid bubble baths, douches (vinegar, yogurt), and soaps.    Vaginal moisturizers imitate your body's normal vaginal moisture and relieve dryness for up to 3 days with a single application. These can be purchased over-the-counter (no prescription needed)but are often located in the pharmacy - ask pharmacist.  but are sometimes difficult to find. You can  also order online on drugstore websites:  K-Y Brand Silk-E for daily use  K-Y Brand Liquibeads vaginal moisturizer for use every 3 days  Astroglide brand Silken Secret for use every 3 days  Replens vaginal moisturizer for use every 3 days  Luvena vaginal moisturizer and prebiotic for use every 3 days  Lubrin vaginal moisturizer for use every 3 days    Vaginal lubricants provide several hours worth of lubrication for use prior to intercourse. Apply directly to vagina or to partner's penis or vaginal dilator. These can be purchased over-the-counter and are easy to find at any drugstore.. Some examples are listed below:  Astroglide  K-Y jelly (can get gummy - can reactivate with water)  K-Y Silk-E

## 2023-04-24 NOTE — LETTER
2023     Samuel Duane Kreis, MD  [address] [place] [address] [place] [address]    Patient: Meliza Mabry   YOB: 1965   Date of Visit: 2023       Dear Samuel Duane Kreis, MD    Meliza Mabry was in my office today. Below is a copy of my note.    If you have questions, please do not hesitate to call me. I look forward to following Meliza along with you.         Sincerely,        Guerda Rhoades MD        CC: No Recipients         Follow Up Office Visit      Patient Name: Meliza Mabry  : 1965   MRN: [id number]     Chief Complaint:  Surveillance for history of granulosa cell tumor    History of Present Illness: Meliza Mabry is a 58 y.o. 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 Today, she is doing well. She is now s/p umbilical hernia repair. She does have vulvar irritation and burning. She does not have abdominal or pelvic pain.  She is considering an evaluation for possible repair. She is tolerating a regular diet and endorses a normal appetite.  She denies early satiety and bloating. Denies any CP, SOB, lightheadedness or dizziness. She denies changes in her bowel/bladder.    Oncologic History:  Oncology/Hematology History   Primary ovarian granulosa cell tumor, unspecified laterality   2021 Initial Diagnosis    Primary ovarian granulosa cell tumor, unspecified laterality     2021 Surgery    Hysterectomy, bilateral salpingo-oophorectomy at [facility]          I have reviewed and the following portions of the patient's history were updated as appropriate: past family history, past medical history, past social history, past surgical history, past obstetrics/gynecologic history and problem list.    Medications: The current medication list was reviewed with the patient and updated in the EMR this date per the Medical Assistant. Medication dosages and frequencies were  "confirmed to be accurate.      Allergies:   Allergies   Allergen Reactions   • Sulfamethoxazole Other (See Comments)   • Amoxicillin Rash   • Latex Rash     Objective     Physical Exam:  Vital Signs:   Vitals:    04/24/23 1250   BP: 127/80   Pulse: 92   Resp: 18   Temp: 98 °F (36.7 °C)   TempSrc: Temporal   SpO2: 100%   Weight: 85.9 kg (189 lb 6.4 oz)   Height: 152.4 cm (60\")   PainSc:   5   PainLoc: Abdomen     BMI: Body mass index is 36.99 kg/m².   ECOG score: 1           PHQ-2 Depression Screening  Little interest or pleasure in doing things?     Feeling down, depressed, or hopeless?     PHQ-2 Total Score         Physical Examination:   General appearance - alert, well appearing, and in no distress and oriented to person, place, and time  Mental status - normal mood, behavior, speech, dress, motor activity, and thought processes  Eyes - sclera anicteric, left eye normal, right eye normal  Ears - right ear normal, left ear normal  Lymphatics - no palpable lymphadenopathy, no hepatosplenomegaly  Lungs - normal respiratory effort, clear to auscultation  Heart - normal rate, regular rhythm, normal S1, S2, no murmurs, rubs, clicks or gallops  Abdomen - soft, nontender, nondistended, no masses or organomegaly, surgical incision healing well  Pelvic - external genitalia with erythema of the bilateral labium majora without obvious lesion, vagina without discharge or lesions, cuff intact, cervix,uterus and adnexa surgically absent, no palpable masses, RV septum smooth  Neurological - alert, oriented, normal speech, no focal findings or movement disorder noted  Musculoskeletal - no joint tenderness, deformity or swelling  Extremities - peripheral pulses normal, no pedal edema, no clubbing or cyanosis  Skin - normal coloration and turgor, no rashes, no suspicious skin lesions noted       Date Inhibin A Inhibin B    12/7/2021 0.5 8.6    3/28/2022 0.7 <7    7/24/2022 2.9 <7    2/10/2023 1.4 <4 Different lab             "       Assessment / Plan    Meliza Mabry is a 58 y.o. female who has a history of a granulosa cell tumor s/p incomplete staging at OSH followed by negative imaging (treatment completed in 11/2021).  She is currently without clinical evidence of disease. Signs of recurrent disease, such as abdominal bloating or distention, vaginal bleeding, abdominopelvic pain, urinary or bowel changes, and shortness of breath were reviewed. She was advised to follow up immediately if she develops any of the above symptoms. She was also reminded to maintain a healthy lifestyle with a well balanced diet, calcium and vitamin D for osteoporosis prevention, and exercise as well as continue with recommended health and cancer screening guidelines. She will follow-up in 3 month with inhibin A and B prior to her next visit.    Vulvar irritation/itching: Suspect dermatitis/contact irritation based on exam. Vulvar hygiene discussed. Fungal culture obtained. Recommend symptomatic treatment with vaseline, coconut oil or barrier cream. Continue to monitor.    Diagnoses and all orders for this visit:    1. Primary ovarian granulosa cell tumor, unspecified laterality (Primary)  -     Inhibin A, Ultrasensitive; Future  -     Inhibin B; Future    2. Vulvar itching  -     Fungus Culture - , Vagina; Future         Follow Up: 3 months    HEYDI Rhoades MD  Gynecologic Oncology

## 2023-04-24 NOTE — PROGRESS NOTES
"     Follow Up Office Visit      Patient Name: Meliza Mabry  : 1965   MRN: [id number]     Chief Complaint:  Surveillance for history of granulosa cell tumor    History of Present Illness: Meliza Mabry is a 58 y.o. 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 Today, she is doing well. She is now s/p umbilical hernia repair. She does have vulvar irritation and burning. She does not have abdominal or pelvic pain.  She is considering an evaluation for possible repair. She is tolerating a regular diet and endorses a normal appetite.  She denies early satiety and bloating. Denies any CP, SOB, lightheadedness or dizziness. She denies changes in her bowel/bladder.    Oncologic History:  Oncology/Hematology History   Primary ovarian granulosa cell tumor, unspecified laterality   2021 Initial Diagnosis    Primary ovarian granulosa cell tumor, unspecified laterality     2021 Surgery    Hysterectomy, bilateral salpingo-oophorectomy at [facility]          I have reviewed and the following portions of the patient's history were updated as appropriate: past family history, past medical history, past social history, past surgical history, past obstetrics/gynecologic history and problem list.    Medications: The current medication list was reviewed with the patient and updated in the EMR this date per the Medical Assistant. Medication dosages and frequencies were confirmed to be accurate.      Allergies:   Allergies   Allergen Reactions   • Sulfamethoxazole Other (See Comments)   • Amoxicillin Rash   • Latex Rash     Objective     Physical Exam:  Vital Signs:   Vitals:    23 1250   BP: 127/80   Pulse: 92   Resp: 18   Temp: 98 °F (36.7 °C)   TempSrc: Temporal   SpO2: 100%   Weight: 85.9 kg (189 lb 6.4 oz)   Height: 152.4 cm (60\")   PainSc:   5   PainLoc: Abdomen     BMI: Body mass index is 36.99 kg/m².   ECOG " score: 1           PHQ-2 Depression Screening  Little interest or pleasure in doing things?     Feeling down, depressed, or hopeless?     PHQ-2 Total Score         Physical Examination:   General appearance - alert, well appearing, and in no distress and oriented to person, place, and time  Mental status - normal mood, behavior, speech, dress, motor activity, and thought processes  Eyes - sclera anicteric, left eye normal, right eye normal  Ears - right ear normal, left ear normal  Lymphatics - no palpable lymphadenopathy, no hepatosplenomegaly  Lungs - normal respiratory effort, clear to auscultation  Heart - normal rate, regular rhythm, normal S1, S2, no murmurs, rubs, clicks or gallops  Abdomen - soft, nontender, nondistended, no masses or organomegaly, surgical incision healing well  Pelvic - external genitalia with erythema of the bilateral labium majora without obvious lesion, vagina without discharge or lesions, cuff intact, cervix,uterus and adnexa surgically absent, no palpable masses, RV septum smooth  Neurological - alert, oriented, normal speech, no focal findings or movement disorder noted  Musculoskeletal - no joint tenderness, deformity or swelling  Extremities - peripheral pulses normal, no pedal edema, no clubbing or cyanosis  Skin - normal coloration and turgor, no rashes, no suspicious skin lesions noted       Date Inhibin A Inhibin B    12/7/2021 0.5 8.6    3/28/2022 0.7 <7    7/24/2022 2.9 <7    2/10/2023 1.4 <4 Different lab                   Assessment / Plan    Meliza Mabry is a 58 y.o. female who has a history of a granulosa cell tumor s/p incomplete staging at OSH followed by negative imaging (treatment completed in 11/2021).  She is currently without clinical evidence of disease. Signs of recurrent disease, such as abdominal bloating or distention, vaginal bleeding, abdominopelvic pain, urinary or bowel changes, and shortness of breath were reviewed. She was advised to follow up  immediately if she develops any of the above symptoms. She was also reminded to maintain a healthy lifestyle with a well balanced diet, calcium and vitamin D for osteoporosis prevention, and exercise as well as continue with recommended health and cancer screening guidelines. She will follow-up in 3 month with inhibin A and B prior to her next visit.    Vulvar irritation/itching: Suspect dermatitis/contact irritation based on exam. Vulvar hygiene discussed. Fungal culture obtained. Recommend symptomatic treatment with vaseline, coconut oil or barrier cream. Continue to monitor.      Diagnoses and all orders for this visit:    1. Primary ovarian granulosa cell tumor, unspecified laterality (Primary)  -     Inhibin A, Ultrasensitive; Future  -     Inhibin B; Future    2. Vulvar itching  -     Fungus Culture - , Vagina; Future         Follow Up: 3 months    HEYDI hRoades MD  Gynecologic Oncology

## 2023-04-25 ENCOUNTER — LAB (OUTPATIENT)
Dept: LAB | Facility: HOSPITAL | Age: 58
End: 2023-04-25
Payer: COMMERCIAL

## 2023-04-25 PROCEDURE — 87102 FUNGUS ISOLATION CULTURE: CPT | Performed by: OBSTETRICS & GYNECOLOGY

## 2023-04-30 LAB — FUNGUS WND CULT: NORMAL

## 2023-05-02 LAB — FUNGUS WND CULT: NORMAL

## 2023-05-09 LAB — FUNGUS WND CULT: NORMAL

## 2023-05-16 LAB — FUNGUS WND CULT: NORMAL

## 2023-05-23 LAB — FUNGUS WND CULT: NORMAL

## 2023-05-30 LAB — FUNGUS WND CULT: NORMAL

## 2023-06-06 LAB — FUNGUS WND CULT: NORMAL

## 2023-08-28 ENCOUNTER — OFFICE VISIT (OUTPATIENT)
Dept: GYNECOLOGIC ONCOLOGY | Facility: CLINIC | Age: 58
End: 2023-08-28
Payer: COMMERCIAL

## 2023-08-28 VITALS
WEIGHT: 182.6 LBS | DIASTOLIC BLOOD PRESSURE: 86 MMHG | RESPIRATION RATE: 18 BRPM | BODY MASS INDEX: 35.85 KG/M2 | OXYGEN SATURATION: 97 % | SYSTOLIC BLOOD PRESSURE: 112 MMHG | HEIGHT: 60 IN | HEART RATE: 92 BPM

## 2023-08-28 DIAGNOSIS — D39.10 PRIMARY OVARIAN GRANULOSA CELL TUMOR, UNSPECIFIED LATERALITY: Primary | ICD-10-CM

## 2023-08-28 PROCEDURE — 99213 OFFICE O/P EST LOW 20 MIN: CPT | Performed by: OBSTETRICS & GYNECOLOGY

## 2023-08-28 RX ORDER — BUSPIRONE HYDROCHLORIDE 5 MG/1
5 TABLET ORAL 3 TIMES DAILY
Qty: 90 TABLET | Refills: 2 | COMMUNITY
Start: 2023-08-24 | End: 2023-11-22

## 2023-08-28 RX ORDER — SERTRALINE HYDROCHLORIDE 25 MG/1
25 TABLET, FILM COATED ORAL DAILY
Qty: 30 TABLET | Refills: 2 | COMMUNITY
Start: 2023-08-24 | End: 2023-11-22

## 2023-08-28 NOTE — PROGRESS NOTES
"     Follow Up Office Visit      Patient Name: Meliza Mabry  : 1965   MRN: [id number]     Chief Complaint:  Surveillance for history of granulosa cell tumor    History of Present Illness: Meliza Mabry is a 58 y.o. 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 Today, she is doing well. She denies any abdominal or pelvic pain. She is tolerating a regular diet and endorses a normal appetite.  She denies early satiety and bloating. Denies any CP, SOB, lightheadedness or dizziness. She denies changes in her bowel/bladder.    Oncologic History:  Oncology/Hematology History   Primary ovarian granulosa cell tumor, unspecified laterality   2021 Initial Diagnosis    Primary ovarian granulosa cell tumor, unspecified laterality     2021 Surgery    Hysterectomy, bilateral sal[facility]oophorectomy at [facility]          I have reviewed and the following portions of the patient's history were updated as appropriate: past family history, past medical history, past social history, past surgical history, past obstetrics/gynecologic history and problem list.    Medications: The current medication list was reviewed with the patient and updated in the EMR this date per the Medical Assistant. Medication dosages and frequencies were confirmed to be accurate.      Allergies:   Allergies   Allergen Reactions    Sulfamethoxazole Other (See Comments)    Amoxicillin Rash    Latex Rash     Objective     Physical Exam:  Vital Signs:   Vitals:    23 1523   BP: 112/86   Pulse: 92   Resp: 18   SpO2: 97%   Weight: 82.8 kg (182 lb 9.6 oz)   Height: 152.4 cm (60\")   PainSc: 0-No pain     BMI: Body mass index is 35.66 kg/mý.   ECOG score: 0           PHQ-2 Depression Screening  Little interest or pleasure in doing things?     Feeling down, depressed, or hopeless?     PHQ-2 Total Score         Physical Examination:   General appearance - " alert, well appearing, and in no distress and oriented to person, place, and time  Mental status - normal mood, behavior, speech, dress, motor activity, and thought processes  Eyes - sclera anicteric, left eye normal, right eye normal  Ears - right ear normal, left ear normal  Lymphatics - no palpable lymphadenopathy, no hepatosplenomegaly  Lungs - normal respiratory effort, clear to auscultation  Heart - normal rate, regular rhythm, normal S1, S2, no murmurs, rubs, clicks or gallops  Abdomen - soft, nontender, nondistended, no masses or organomegaly, no recurrent hernia  Pelvic - external genitalia with erythema of the bilateral labium majora without obvious lesion, vagina without discharge or lesions, cuff intact, cervix,uterus and adnexa surgically absent, no palpable masses, RV septum smooth  Neurological - alert, oriented, normal speech, no focal findings or movement disorder noted  Musculoskeletal - no joint tenderness, deformity or swelling  Extremities - peripheral pulses normal, no pedal edema, no clubbing or cyanosis  Skin - normal coloration and turgor, no rashes, no suspicious skin lesions noted       Date Inhibin A Inhibin B    12/7/2021 0.5 8.6    3/28/2022 0.7 <7    7/24/2022 2.9 <7    2/10/2023 1.4 <4 Different lab                   Assessment / Plan    Meliza Mabry is a 58 y.o. female who has a history of a granulosa cell tumor s/p incomplete staging at OSH followed by negative imaging (treatment completed in 11/2021).  She is currently without clinical evidence of disease. Signs of recurrent disease, such as abdominal bloating or distention, vaginal bleeding, abdominopelvic pain, urinary or bowel changes, and shortness of breath were reviewed. She was advised to follow up immediately if she develops any of the above symptoms. She was also reminded to maintain a healthy lifestyle with a well balanced diet, calcium and vitamin D for osteoporosis prevention, and exercise as well as continue with  recommended health and cancer screening guidelines. She will follow-up in 3 month with inhibin A and B prior to her next visit.    Diagnoses and all orders for this visit:    1. Primary ovarian granulosa cell tumor, unspecified laterality (Primary)           Follow Up: 3 months    HEYDI Rhoades MD  Gynecologic Oncology

## 2023-12-26 ENCOUNTER — TELEPHONE (OUTPATIENT)
Dept: GYNECOLOGIC ONCOLOGY | Facility: CLINIC | Age: 58
End: 2023-12-26
Payer: COMMERCIAL

## 2023-12-26 NOTE — TELEPHONE ENCOUNTER
Caller: Meliza Mabry    Relationship: Self    Best call back number: [phone]      What was the call regarding: PATIENT CALLED TO SCHEDULE FOLLOW UP IN [place]

## 2023-12-26 NOTE — TELEPHONE ENCOUNTER
Spoke with patient. Advised that Dr Rhoades was no longer at this practice. Patient does not want to come to [place]. She will see a dr garcia

## 2024-01-05 ENCOUNTER — TELEPHONE (OUTPATIENT)
Dept: GYNECOLOGIC ONCOLOGY | Facility: CLINIC | Age: 59
End: 2024-01-05
Payer: COMMERCIAL

## 2024-01-05 NOTE — TELEPHONE ENCOUNTER
CALLED PT BACK TO SEE IF SHE WANTED TO MAKE AN APPT WITH DR GUTIERREZ IN [place]. PT STATED HER REG GYN DIDN'T FEEL COMFORTABLE SEEING . WILL SCHEDULE WITH DR GUTIERREZ . THANKS

## 2024-02-26 ENCOUNTER — TELEPHONE (OUTPATIENT)
Dept: GYNECOLOGIC ONCOLOGY | Facility: CLINIC | Age: 59
End: 2024-02-26
Payer: COMMERCIAL

## 2024-02-26 NOTE — TELEPHONE ENCOUNTER
Caller: Meliza Mabry    Relationship to patient: Self    Best call back number: [phone]    Chief complaint:     Type of visit: F/U 2     Requested date: 3/11. 3/21/2024 OR 3/22/2024. CALL TO R/S     If rescheduling, when is the original appointment: 2/28/2024    Additional notes:

## 2024-03-26 NOTE — PROGRESS NOTES
Meliza Mabry  [id number]  1965      Reason for visit:  Surveillance for history of granulosa cell tumor    History of present illness:  The patient is a 58 y.o. year old female who presents today for treatment and evaluation of the above issues.    Previously seen by Dr. Rhoades. Last in 8/2023.     Today, she is doing well. She denies any abdominal or pelvic pain. She is tolerating a regular diet and endorses a normal appetite.  She denies early satiety and bloating. Denies any CP, SOB, lightheadedness or dizziness. She denies changes in her bowel/bladder. She denies any vaginal bleeding.         Oncologic History:  Oncology/Hematology History   Primary ovarian granulosa cell tumor, unspecified laterality   11/19/2021 Initial Diagnosis    Primary ovarian granulosa cell tumor, unspecified laterality     11/19/2021 Surgery    Hysterectomy, bilateral salpingo-oophorectomy at [facility]           Past Medical History:   Diagnosis Date    Granulosa cell tumor        Past Surgical History:   Procedure Laterality Date    CATARACT EXTRACTION  2021    HERNIA REPAIR  03/24/2023    HYSTERECTOMY  11/2021       MEDICATIONS:    Current Outpatient Medications:     acetaminophen (TYLENOL) 500 MG tablet, Take 1 tablet by mouth Every 6 (Six) Hours As Needed., Disp: , Rfl:     fenofibrate (TRICOR) 145 MG tablet, , Disp: , Rfl:     Rhubarb (ESTROVEN COMPLETE PO), Take  by mouth., Disp: , Rfl:     busPIRone (BUSPAR) 5 MG tablet, Take 1 tablet by mouth 3 (Three) Times a Day., Disp: 90 tablet, Rfl: 2    cetirizine (zyrTEC) 10 MG tablet, Take 10 mg by mouth Daily., Disp: , Rfl:     sertraline (ZOLOFT) 25 MG tablet, Take 1 tablet by mouth Daily., Disp: 30 tablet, Rfl: 2     Allergies:  is allergic to sulfamethoxazole, trimethoprim, amoxicillin, and latex.    Social History:   Social History     Socioeconomic History    Marital status:    Tobacco Use    Smoking status: Every Day     Current packs/day: 0.10     Types:  "Cigarettes    Smokeless tobacco: Never   Vaping Use    Vaping status: Never Used   Substance and Sexual Activity    Alcohol use: Not Currently    Drug use: Never    Sexual activity: Defer     Birth control/protection: Surgical       Family History:  History reviewed. No pertinent family history.    Health Maintenance:    Health Maintenance   Topic Date Due    MAMMOGRAM  Never done    BMI FOLLOWUP  Never done    COLORECTAL CANCER SCREENING  Never done    Pneumococcal Vaccine 0-64 (1 of 2 - PCV) Never done    TDAP/TD VACCINES (1 - Tdap) Never done    ZOSTER VACCINE (1 of 2) Never done    HEPATITIS C SCREENING  Never done    ANNUAL PHYSICAL  Never done    COVID-19 Vaccine (3 - 2023-24 season) 09/01/2023    LIPID PANEL  12/25/2024    INFLUENZA VACCINE  Completed         Review of Systems:   All other systems were reviewed and are negative except as mentioned above.    Physical Exam    Vitals:    03/27/24 1019   BP: 146/90   Pulse: 84   Resp: 18   Temp: 98.1 °F (36.7 °C)   TempSrc: Temporal   SpO2: 98%   Weight: 79.6 kg (175 lb 8 oz)   Height: 152.4 cm (60\")   PainSc: 0-No pain       Body mass index is 34.27 kg/m².    Wt Readings from Last 3 Encounters:   03/27/24 79.6 kg (175 lb 8 oz)   08/28/23 82.8 kg (182 lb 9.6 oz)   04/24/23 85.9 kg (189 lb 6.4 oz)       GENERAL: Alert, well-appearing female appearing her stated age who is in no apparent distress.   HEENT: Sclera anicteric. Head normocephalic, atraumatic. Mucus membranes moist.   NECK: Trachea midline, supple, without masses.  No thyromegaly.   BREASTS: Deferred  CARDIOVASCULAR: Normal rate, regular rhythm, no murmurs, rubs, or gallops.  No peripheral edema.  RESPIRATORY: Clear to auscultation bilaterally, normal respiratory effort  BACK:  No CVA tenderness, no vertebral tenderness on palpation  GASTROINTESTINAL:  Abdomen is soft, non-tender, non-distended, no rebound or guarding, no masses, or hernias. No HSM.  Incisions well healed.   SKIN:  Warm, dry, " "well-perfused.  All visible areas intact.  No rashes, lesions, ulcers.  PSYCHIATRIC: AO x3, with appropriate affect, normal thought processes.  NEUROLOGIC: No focal deficits.  Moves extremities well.  MUSCULOSKELETAL: Normal gait and station.   EXTREMITIES:   No cyanosis, clubbing, symmetric.  LYMPHATICS:  No cervical or inguinal adenopathy noted.     PELVIC exam:    External genitalia are free from lesion.  On bimanual examination, no fullness was appreciated.  Uterus, cervix and adnexa were absent.  There was no significant tenderness.  Rectovaginal exam was deferred.    ECOG PS 0    PROCEDURES:      Diagnostic Data:      No Images in the past 120 days found..    Lab Results   Component Value Date    WBC 7.80 12/29/2021    HGB 11.1 (L) 12/29/2021    HCT 33.3 (L) 12/29/2021    MCV 95.5 12/29/2021     12/29/2021    NEUTROABS 5.20 12/29/2021     No results found for: \"\"        Assessment & Plan   This is a 58 y.o. woman with hx of granulosa cell tumor.     Granulosa cell tumor  -s/p incomplete staging at OSH followed by negative imaging (treatment completed in 11/2021).   -She is currently without clinical evidence of disease.   -Signs of recurrent disease, such as abdominal bloating or distention, vaginal bleeding, abdominopelvic pain, urinary or bowel changes, and shortness of breath were reviewed. She was advised to follow up immediately if she develops any of the above symptoms  -inhibin have been negative, last 8/23  - Inhibins,  ordered today  - She will follow-up in 6 month     Encounter Diagnosis   Name Primary?    Primary ovarian granulosa cell tumor, unspecified laterality Yes       Pain assessment was performed today as a part of patient’s care.  For patients with pain related to surgery, gynecologic malignancy or cancer treatment, the plan is as noted in the assessment/plan.  For patients with pain not related to these issues, they are to seek any further needed care from a more " appropriate provider, such as PCP.      Orders Placed This Encounter   Procedures    Inhibin A, Ultrasensitive     Standing Status:   Future     Standing Expiration Date:   3/27/2025     Order Specific Question:   Release to patient     Answer:   Routine Release [[id number]]    Inhibin B     Standing Status:   Future     Standing Expiration Date:   3/27/2025     Order Specific Question:   Release to patient     Answer:   Routine Release [[id number]]         Standing Status:   Future     Standing Expiration Date:   3/27/2025     Order Specific Question:   Release to patient     Answer:   Routine Release [[id number]]     FOLLOW UP: 6 months    Katlin Fry MD   PGY3- OBGYN Resident   [facility]  I spent 23 minutes caring for Meliza on this date of service. This time includes time spent by me in the following activities: preparing for the visit, reviewing tests, performing a medically appropriate examination and/or evaluation, counseling and educating the patient/family/caregiver, ordering medications, tests, or procedures, referring and communicating with other health care professionals, documenting information in the medical record, and care coordination    Patient was seen and examined with Dr. Fry,  resident, who performed portions of the examination and documentation for this patient's care under my direct supervision.  I agree with the above documentation and plan.    Harriet De La Garza MD  03/28/24  17:24 EDT

## 2024-03-27 ENCOUNTER — OFFICE VISIT (OUTPATIENT)
Dept: GYNECOLOGIC ONCOLOGY | Facility: CLINIC | Age: 59
End: 2024-03-27
Payer: COMMERCIAL

## 2024-03-27 VITALS
DIASTOLIC BLOOD PRESSURE: 90 MMHG | OXYGEN SATURATION: 98 % | RESPIRATION RATE: 18 BRPM | TEMPERATURE: 98.1 F | WEIGHT: 175.5 LBS | SYSTOLIC BLOOD PRESSURE: 146 MMHG | HEIGHT: 60 IN | BODY MASS INDEX: 34.46 KG/M2 | HEART RATE: 84 BPM

## 2024-03-27 DIAGNOSIS — D39.10 PRIMARY OVARIAN GRANULOSA CELL TUMOR, UNSPECIFIED LATERALITY: Primary | ICD-10-CM

## 2024-03-27 PROCEDURE — 99213 OFFICE O/P EST LOW 20 MIN: CPT | Performed by: OBSTETRICS & GYNECOLOGY

## 2024-04-03 ENCOUNTER — TELEPHONE (OUTPATIENT)
Dept: GYNECOLOGIC ONCOLOGY | Facility: CLINIC | Age: 59
End: 2024-04-03
Payer: COMMERCIAL

## 2024-04-03 NOTE — TELEPHONE ENCOUNTER
----- Message from Harriet De La Garza MD sent at 4/3/2024  9:35 AM EDT -----  Please notify patient that inhibin A & B are normal  thanks!  ----- Message -----  From: Sara Beltre Incoming  Sent: 4/1/2024   7:58 AM EDT  To: Harriet De La Garza MD

## 2024-04-03 NOTE — TELEPHONE ENCOUNTER
Patient notified of negative Lab results per Providers request with verbalized understanding and offers thank you

## 2024-06-04 NOTE — TELEPHONE ENCOUNTER
Caller: Meliza Mabry    Relationship: Self    Best call back number: [phone]      What was the call regarding: PT NEEDS LAB ORDERS FAXED -[phone] THEY COULDN'T FIND THEM IN Epic       Shift report given to Chanda face to face. Patient is stable. All end of shift needs have been met. No further assistance needed at this time.

## 2024-09-24 ENCOUNTER — OFFICE VISIT (OUTPATIENT)
Dept: GYNECOLOGIC ONCOLOGY | Facility: CLINIC | Age: 59
End: 2024-09-24
Payer: COMMERCIAL

## 2024-09-24 VITALS
WEIGHT: 185.6 LBS | DIASTOLIC BLOOD PRESSURE: 79 MMHG | HEIGHT: 60 IN | HEART RATE: 86 BPM | OXYGEN SATURATION: 98 % | SYSTOLIC BLOOD PRESSURE: 139 MMHG | RESPIRATION RATE: 17 BRPM | BODY MASS INDEX: 36.44 KG/M2 | TEMPERATURE: 97.7 F

## 2024-09-24 DIAGNOSIS — D39.10 PRIMARY OVARIAN GRANULOSA CELL TUMOR, UNSPECIFIED LATERALITY: Primary | ICD-10-CM

## 2024-09-24 DIAGNOSIS — Z87.42 HISTORY OF ABNORMAL CERVICAL PAP SMEAR: ICD-10-CM

## 2024-09-24 PROCEDURE — 99213 OFFICE O/P EST LOW 20 MIN: CPT | Performed by: NURSE PRACTITIONER

## 2024-10-04 LAB — REF LAB TEST METHOD: NORMAL

## 2024-10-07 ENCOUNTER — TELEPHONE (OUTPATIENT)
Dept: GYNECOLOGIC ONCOLOGY | Facility: CLINIC | Age: 59
End: 2024-10-07
Payer: COMMERCIAL

## 2024-10-07 NOTE — TELEPHONE ENCOUNTER
Patient notified of pap results per APRN request. Patient verbalized understanding.     ----- Message from Magi Alas sent at 10/5/2024 10:59 PM EDT -----  Please let Meliza know her pap smear from 9/24 showed normal results.

## 2025-06-11 ENCOUNTER — OFFICE VISIT (OUTPATIENT)
Dept: GYNECOLOGIC ONCOLOGY | Facility: CLINIC | Age: 60
End: 2025-06-11
Payer: COMMERCIAL

## 2025-06-11 VITALS
SYSTOLIC BLOOD PRESSURE: 110 MMHG | RESPIRATION RATE: 18 BRPM | HEART RATE: 82 BPM | DIASTOLIC BLOOD PRESSURE: 62 MMHG | HEIGHT: 60 IN | BODY MASS INDEX: 37.95 KG/M2 | TEMPERATURE: 97.2 F | OXYGEN SATURATION: 100 % | WEIGHT: 193.3 LBS

## 2025-06-11 DIAGNOSIS — D39.10 PRIMARY OVARIAN GRANULOSA CELL TUMOR, UNSPECIFIED LATERALITY: Primary | ICD-10-CM

## 2025-06-11 PROCEDURE — 99213 OFFICE O/P EST LOW 20 MIN: CPT | Performed by: NURSE PRACTITIONER

## 2025-06-11 NOTE — PROGRESS NOTES
"GYN ONCOLOGY CANCER SURVEILLANCE FOLLOW-UP    Meliza Mabry  [id number]  1965    Subjective   Chief Complaint: follow up, granulosa cell tumor    History of present illness:   Meliza Mabry is a 60 y.o. year old female who is here today for ongoing surveillance of Ovarian Cancer, see Cancer History. She is now ~3.5 years out since completion of treatment and doing well.  She has no acute complaints or concerns at this time.  Denies any major changes in health since last visit.  She denies vaginal bleeding and discharge. She does not have abdominal or pelvic pain. She is tolerating a regular diet and endorses a normal appetite. She denies nausea and vomiting. She denies early satiety and bloating. Denies any CP, SOB, lightheadedness or dizziness. She denies changes in her bowel/bladder. No dysuria, frequency, urgency, hematuria or flank pain. Reports normal energy levels.   Pap 9/2024, normal           Cancer History:   Oncology/Hematology History   Primary ovarian granulosa cell tumor, unspecified laterality   11/19/2021 Initial Diagnosis    Primary ovarian granulosa cell tumor, unspecified laterality     11/19/2021 Surgery    Hysterectomy, bilateral salpingo-oophorectomy at [facility]           The current medication list and allergy list were reviewed and reconciled.     Past Medical History, Past Surgical History, Social History, Family History have been reviewed and are without significant changes except as mentioned.      Review of Systems  See hpi      Objective   Physical Exam  Vital Signs: /62   Pulse 82   Temp 97.2 °F (36.2 °C) (Temporal)   Resp 18   Ht 152.4 cm (60\")   Wt 87.7 kg (193 lb 4.8 oz)   SpO2 100%   BMI 37.75 kg/m²   Vitals:    06/11/25 0933   PainSc: 0-No pain           General Appearance:  alert, cooperative, no apparent distress and appears stated age   Neurologic/Psych: A&O x 3, gait steady, appropriate affect   HEENT:  Normocephalic, without obvious abnormality, " mucous membranes moist   Abdomen:   Soft, non-tender, non-distended, and no organomegaly   Lymph nodes: No cervical, supraclavicular, inguinal adenopathy noted   Pelvic: External genitalia are free from lesion.  On speculum examination, the vaginal cuff was intact and no lesions were appreciated.  On bimanual examination, no fullness was appreciated.  Uterus, cervix, and adnexa were absent.  There was no significant tenderness.  The rectovaginal exam was deferred.       ECOG score: 0                         Assessment and Plan:   -s/p incomplete staging at OSH followed by negative imaging (treatment completed in 11/2021).   -She is currently without clinical evidence of disease.   -Signs of recurrent disease, such as abdominal bloating or distention, vaginal bleeding, abdominopelvic pain, urinary or bowel changes, and shortness of breath were reviewed. She was advised to follow up immediately if she develops any of the above symptoms  -inhibins and ca125 have been negative, due to repeat  - She will follow-up in 6 month for surveillance    Diagnoses and all orders for this visit:    1. Primary ovarian granulosa cell tumor, unspecified laterality (Primary)  -     Inhibin A, Ultrasensitive; Future  -     Inhibin B; Future  -     ; Future      Pain assessment was performed today as a part of patient’s care.  For patients with pain related to surgery, gynecologic malignancy or cancer treatment, the plan is as noted in the assessment/plan.  For patients with pain not related to these issues, they are to seek any further needed care from a more appropriate provider, such as PCP.    Follow-up:     Return to clinic in 6 months for ongoing cancer surveillance.      Electronically signed by WINSOME Melara on 06/11/2025